# Patient Record
Sex: FEMALE | Race: WHITE
[De-identification: names, ages, dates, MRNs, and addresses within clinical notes are randomized per-mention and may not be internally consistent; named-entity substitution may affect disease eponyms.]

---

## 2019-06-26 ENCOUNTER — HOSPITAL ENCOUNTER (EMERGENCY)
Dept: HOSPITAL 97 - ER | Age: 34
Discharge: HOME | End: 2019-06-26
Payer: COMMERCIAL

## 2019-06-26 VITALS — SYSTOLIC BLOOD PRESSURE: 99 MMHG | DIASTOLIC BLOOD PRESSURE: 68 MMHG

## 2019-06-26 VITALS — OXYGEN SATURATION: 100 %

## 2019-06-26 VITALS — TEMPERATURE: 99.1 F

## 2019-06-26 DIAGNOSIS — K52.9: Primary | ICD-10-CM

## 2019-06-26 DIAGNOSIS — D72.829: ICD-10-CM

## 2019-06-26 DIAGNOSIS — K59.00: ICD-10-CM

## 2019-06-26 LAB
ALBUMIN SERPL BCP-MCNC: 3.9 G/DL (ref 3.4–5)
ALP SERPL-CCNC: 53 U/L (ref 45–117)
ALT SERPL W P-5'-P-CCNC: 23 U/L (ref 12–78)
AST SERPL W P-5'-P-CCNC: 25 U/L (ref 15–37)
BLD SMEAR INTERP: (no result)
BUN BLD-MCNC: 9 MG/DL (ref 7–18)
GLUCOSE SERPLBLD-MCNC: 105 MG/DL (ref 74–106)
HCT VFR BLD CALC: 40.3 % (ref 36–45)
LIPASE SERPL-CCNC: 91 U/L (ref 73–393)
LYMPHOCYTES # SPEC AUTO: 0.4 K/UL (ref 0.7–4.9)
MORPHOLOGY BLD-IMP: (no result)
PMV BLD: 8.1 FL (ref 7.6–11.3)
POTASSIUM SERPL-SCNC: 3.9 MMOL/L (ref 3.5–5.1)
RBC # BLD: 4.38 M/UL (ref 3.86–4.86)
UA COMPLETE W REFLEX CULTURE PNL UR: (no result)

## 2019-06-26 PROCEDURE — 81015 MICROSCOPIC EXAM OF URINE: CPT

## 2019-06-26 PROCEDURE — 81025 URINE PREGNANCY TEST: CPT

## 2019-06-26 PROCEDURE — 74177 CT ABD & PELVIS W/CONTRAST: CPT

## 2019-06-26 PROCEDURE — 80076 HEPATIC FUNCTION PANEL: CPT

## 2019-06-26 PROCEDURE — 81003 URINALYSIS AUTO W/O SCOPE: CPT

## 2019-06-26 PROCEDURE — 87088 URINE BACTERIA CULTURE: CPT

## 2019-06-26 PROCEDURE — 96361 HYDRATE IV INFUSION ADD-ON: CPT

## 2019-06-26 PROCEDURE — 96368 THER/DIAG CONCURRENT INF: CPT

## 2019-06-26 PROCEDURE — 80048 BASIC METABOLIC PNL TOTAL CA: CPT

## 2019-06-26 PROCEDURE — 96375 TX/PRO/DX INJ NEW DRUG ADDON: CPT

## 2019-06-26 PROCEDURE — 87086 URINE CULTURE/COLONY COUNT: CPT

## 2019-06-26 PROCEDURE — 83690 ASSAY OF LIPASE: CPT

## 2019-06-26 PROCEDURE — 87804 INFLUENZA ASSAY W/OPTIC: CPT

## 2019-06-26 PROCEDURE — 99284 EMERGENCY DEPT VISIT MOD MDM: CPT

## 2019-06-26 PROCEDURE — 85025 COMPLETE CBC W/AUTO DIFF WBC: CPT

## 2019-06-26 PROCEDURE — 96365 THER/PROPH/DIAG IV INF INIT: CPT

## 2019-06-26 PROCEDURE — 36415 COLL VENOUS BLD VENIPUNCTURE: CPT

## 2019-06-26 NOTE — EDPHYS
Physician Documentation                                                                           

 [facility]                                                                 

Name: Lety Kevin                                                                               

Age: 34 yrs                                                                                       

Sex: Female                                                                                       

: 1985                                                                                   

MRN: [id number]                                                                                   

Arrival Date: 2019                                                                          

Time: 02:43                                                                                       

Account#: [id number]                                                                            

Bed 15                                                                                            

Private MD:                                                                                       

[facility] Physician Tomás Tamez                                                                      

HPI:                                                                                              

                                                                                             

03:44 This 34 yrs old  Female presents to ER via Ambulatory with complaints of       brant 

      Nausea, Headache.                                                                           

03:44 The patient presents to the emergency department with nausea, vomiting. Onset: The      brant 

      symptoms/episode began/occurred just prior to arrival, this morning. Possible causes:       

      unknown. The symptoms are aggravated by movement. Associated signs and symptoms: The        

      patient has no apparent associated signs or symptoms. Severity of symptoms: At their        

      worst the symptoms were mild moderate in the emergency department the symptoms are          

      unchanged. The patient has not experienced similar symptoms in the past.                    

                                                                                                  

OB/GYN:                                                                                           

02:47 LMP 2019                                                                            ed1 

                                                                                                  

Historical:                                                                                       

- Allergies:                                                                                      

02:47 No Known Allergies;                                                                     ed1 

- Home Meds:                                                                                      

02:47 None [Active];                                                                          ed1 

- PMHx:                                                                                           

02:47 None;                                                                                   ed1 

- PSHx:                                                                                           

02:47 ;                                                                              ed1 

                                                                                                  

- Immunization history:: Adult Immunizations up to date.                                          

- Social history:: Smoking status: Patient/guardian denies using tobacco.                         

- Ebola Screening: : Patient negative for fever greater than or equal to 101.5 degrees            

  Fahrenheit, and additional compatible Ebola Virus Disease symptoms Patient denies               

  exposure to infectious person Patient denies travel to an Ebola-affected area in the            

  21 days before illness onset No symptoms or risks identified at this time.                      

                                                                                                  

                                                                                                  

ROS:                                                                                              

03:45 Constitutional: Negative for fever, chills, and weight loss, Eyes: Negative for injury, brant 

      pain, redness, and discharge, ENT: Negative for injury, pain, and discharge, Neck:          

      Negative for injury, pain, and swelling, Cardiovascular: Negative for chest pain,           

      palpitations, and edema, Respiratory: Negative for shortness of breath, cough,              

      wheezing, and pleuritic chest pain, Back: Negative for injury and pain, : Negative        

      for injury, bleeding, discharge, and swelling, MS/Extremity: Negative for injury and        

      deformity, Skin: Negative for injury, rash, and discoloration, Neuro: Negative for          

      headache, weakness, numbness, tingling, and seizure, Psych: Negative for depression,        

      anxiety, suicide ideation, homicidal ideation, and hallucinations, Allergy/Immunology:      

      Negative for hives, rash, and allergies, Endocrine: Negative for neck swelling,             

      polydipsia, polyuria, polyphagia, and marked weight changes, Hematologic/Lymphatic:         

      Negative for swollen nodes, abnormal bleeding, and unusual bruising.                        

03:45 Abdomen/GI: Positive for abdominal pain, of the right upper quadrant and right lower        

      quadrant.                                                                                   

                                                                                                  

Exam:                                                                                             

03:45 Constitutional:  This is a well developed, well nourished patient who is awake, alert,  brant 

      and in no acute distress. Head/Face:  Normocephalic, atraumatic. Eyes:  Pupils equal        

      round and reactive to light, extra-ocular motions intact.  Lids and lashes normal.          

      Conjunctiva and sclera are non-icteric and not injected.  Cornea within normal limits.      

      Periorbital areas with no swelling, redness, or edema. ENT:  Nares patent. No nasal         

      discharge, no septal abnormalities noted.  Tympanic membranes are normal and external       

      auditory canals are clear.  Oropharynx with no redness, swelling, or masses, exudates,      

      or evidence of obstruction, uvula midline.  Mucous membranes moist. Neck:  Trachea          

      midline, no thyromegaly or masses palpated, and no cervical lymphadenopathy.  Supple,       

      full range of motion without nuchal rigidity, or vertebral point tenderness.  No            

      Meningismus. Chest/axilla:  Normal chest wall appearance and motion.  Nontender with no     

      deformity.  No lesions are appreciated. Cardiovascular:  Regular rate and rhythm with a     

      normal S1 and S2.  No gallops, murmurs, or rubs.  Normal PMI, no JVD.  No pulse             

      deficits. Respiratory:  Lungs have equal breath sounds bilaterally, clear to                

      auscultation and percussion.  No rales, rhonchi or wheezes noted.  No increased work of     

      breathing, no retractions or nasal flaring. Back:  No spinal tenderness.  No                

      costovertebral tenderness.  Full range of motion. Female :  Normal external               

      genitalia. Skin:  Warm, dry with normal turgor.  Normal color with no rashes, no            

      lesions, and no evidence of cellulitis. MS/ Extremity:  Pulses equal, no cyanosis.          

      Neurovascular intact.  Full, normal range of motion. Neuro:  Awake and alert, GCS 15,       

      oriented to person, place, time, and situation.  Cranial nerves II-XII grossly intact.      

      Motor strength 5/5 in all extremities.  Sensory grossly intact.  Cerebellar exam            

      normal.  Normal gait. Psych:  Awake, alert, with orientation to person, place and time.     

       Behavior, mood, and affect are within normal limits.                                       

03:45 Abdomen/GI: Inspection: abdomen appears normal, Bowel sounds: normal, Palpation:            

      moderate abdominal tenderness, in the right upper quadrant and right lower quadrant,        

      Liver: no appreciated palpable abnormalities, Hernia: not appreciated.                      

                                                                                                  

Vital Signs:                                                                                      

02:47  / 76; Pulse 114; Resp 18; Temp 99.1(TE); Pulse Ox 100% on R/A; Weight 56.7 kg;   ed1 

      Height 5 ft. 6 in. (167.64 cm); Pain 9/10;                                                  

04:30  / 69; Pulse 97; Resp 16; Pulse Ox 97% on R/A;                                    jb4 

05:30  / 65; Pulse 107; Resp 18; Pulse Ox 100% on R/A;                                  jb4 

07:00 BP 99 / 68; Pulse 98; Resp 16; Pulse Ox 100% on R/A;                                    jb4 

02:47 Body Mass Index 20.18 (56.70 kg, 167.64 cm)                                             ed1 

                                                                                                  

MDM:                                                                                              

03:20 Patient medically screened.                                                             [facility] 

03:46 Data reviewed: vital signs, nurses notes, lab test result(s), EKG, radiologic studies.  [facility] 

                                                                                                  

                                                                                             

02:47 Order name: Urine Culture                                                               [facility] 

                                                                                             

02:47 Order name: Urine Microscopic Only; Complete Time: 04:50                                [facility] 

                                                                                             

03:10 Order name: Flu; Complete Time: 05:36                                                   [facility] 

                                                                                             

03:21 Order name: Urine Dipstick--Ancillary (enter results); Complete Time: 03:42             [facility] 

                                                                                             

03:21 Order name: Urine Pregnancy--Ancillary (enter results); Complete Time: 03:42            [facility] 

                                                                                             

03:44 Order name: Basic Metabolic Panel                                                       [facility] 

                                                                                             

03:44 Order name: CBC with Diff                                                               [facility] 

                                                                                             

03:44 Order name: Hepatic Function; Complete Time: 04:50                                      [facility] 

                                                                                             

03:44 Order name: Lipase; Complete Time: 04:50                                                [facility] 

                                                                                             

03:44 Order name: CT Abd/Pelvis - IV Contrast Only                                            [facility] 

                                                                                             

03:44 Order name: Basic Metabolic Panel; Complete Time: 04:50                                 [facility]

                                                                                             

03:45 Order name: CBC with Automated Diff; Complete Time: 05:36                               [facility]

                                                                                             

05:11 Order name: CBC Smear Scan; Complete Time: 05:36                                        [facility]

                                                                                             

02:47 Order name: Urine Pregnancy Test (obtain specimen); Complete Time: 03:19                [facility] 

                                                                                             

02:47 Order name: Urine Dipstick-Ancillary (obtain specimen); Complete Time: 03:20            [facility] 

                                                                                             

03:44 Order name: IV Saline Lock; Complete Time: 04:30                                        brant 

                                                                                             

03:44 Order name: Labs collected and sent; Complete Time: 04:30                               brant 

                                                                                                  

Administered Medications:                                                                         

03:55 Drug: Zofran 4 mg Route: IVP; Site: right antecubital;                                  jb4 

04:25 Follow up: Response: No adverse reaction; Nausea is decreased                           jb4 

03:57 Drug: NS 0.9% 1000 ml Route: IV; Rate: 1 bolus; Site: right antecubital;                4 

05:00 Follow up: Response: No adverse reaction; IV Status: Completed infusion; IV Intake:     jb4 

      1000ml                                                                                      

03:58 Drug: morphine 2 mg Route: IVP; Site: right antecubital;                                jb4 

04:10 Follow up: Response: No adverse reaction; Pain is unchanged, physician notified         jb4 

04:43 Drug: morphine 2 mg Route: IVP; Site: right antecubital;                                jb4 

05:10 Follow up: Response: No adverse reaction; Pain is unchanged, physician notified         jb4 

05:25 Drug: morphine 4 mg Route: IVP; Site: right antecubital;                                jb4 

05:55 Follow up: Response: No adverse reaction; Pain is decreased                             jb4 

06:30 Drug: Cipro 400 mg Volume: 200 ml; Route: IVPB; Infused Over: 60 mins; Site: right      jb4 

      antecubital;                                                                                

07:45 Follow up: Response: No adverse reaction; IV Status: Completed infusion                   

06:30 Drug: Flagyl 500 mg Volume: 100 ml; Route: IVPB; Rate: 200 ml/hr; Infused Over: 30      jb4 

      mins; Site: right antecubital;                                                              

07:00 Follow up: Response: No adverse reaction; IV Status: Completed infusion; IV Intake:     jb4 

      100ml                                                                                       

                                                                                                  

                                                                                                  

Disposition:                                                                                      

19 06:31 Discharged to Home. Impression: Abdominal tenderness - enteritis, Elevated         

  white blood cell count, Constipation.                                                           

- Condition is Stable.                                                                            

- Discharge Instructions: Abdominal Pain, Adult, Constipation, Adult, Nausea and                  

  Vomiting, Adult, Abdominal Pain, Adult, Easy-to-Read.                                           

- Prescriptions for Bentyl 20 mg Oral Tablet - take 1 tablet by ORAL route every 6                

  hours As needed; 20 tablet. Flagyl 500 mg Oral Tablet - take 1 tablet by ORAL route             

  every 8 hours for 7 days; 21 tablet. Pepcid 20 mg Oral Tablet - take 1 tablet by ORAL           

  route every 12 hours for 10 days; 20 tablet. Zofran 4 mg Oral Tablet - take 1 tablet            

  by ORAL route every 12 hours As needed; 20 tablet. Cipro 500 mg Oral Tablet - take 1            

  tablet by ORAL route every 12 hours for 7 days; 14 tablet.                                      

- Medication Reconciliation Form, Thank You Letter, Antibiotic Education, Prescription            

  Opioid Use, Work release form form.                                                             

- Follow up: Private Physician; When: 2 - 3 days; Reason: Recheck today's complaints,             

  Continuance of care, Re-evaluation by your physician. Follow up: Jasper Allen MD;           

  When: 2 - 3 days; Reason: Recheck today's complaints, Re-evaluation by your physician.          

- Problem is new.                                                                                 

- Symptoms have improved.                                                                         

                                                                                                  

                                                                                                  

                                                                                                  

Signatures:                                                                                       

Dispatcher [employer]                           [facility]                                                 

Tomás Tamez MD MD cha Therrien, Shelly, FNP-C                 FNP-Csnw                                                  

Bianca Carson, RN                        RN   ed1                                                  

Kathy Manriquez, RN                      RN   Davis Gill, RN                       RN   jb4                                                  

                                                                                                  

Corrections: (The following items were deleted from the chart)                                    

05:02 03:45 Creatinine for Radiology+C.LAB.BRZ ordered. [facility]

06:32 06:31 2019 06:31 Discharged to Home. Impression: Abdominal tenderness -           brant 

      enteritis; Elevated white blood cell count; Constipation. Condition is Stable. Forms        

      are Medication Reconciliation Form, Thank You Letter, Antibiotic Education,                 

      Prescription Opioid Use. Follow up: Private Physician; When: 2 - 3 days; Reason:            

      Recheck today's complaints, Continuance of care, Re-evaluation by your physician.           

      Problem is new. Symptoms have improved. brant                                                 

08:50 06:32 2019 06:31 Discharged to Home. Impression: Abdominal tenderness -           ph  

      enteritis; Elevated white blood cell count; Constipation. Condition is Stable. Forms        

      are Medication Reconciliation Form, Thank You Letter, Antibiotic Education,                 

      Prescription Opioid Use. Follow up: Private Physician; When: 2 - 3 days; Reason:            

      Recheck today's complaints, Continuance of care, Re-evaluation by your physician.           

      Follow up: Jasper Allen; When: 2 - 3 days; Reason: Recheck today's complaints,            

      Re-evaluation by your physician. Problem is new. Symptoms have improved. brant                

                                                                                                  

**************************************************************************************************

## 2019-06-26 NOTE — ER
Nurse's Notes                                                                                     

 [facility]                                                                 

Name: Lety Kevin                                                                               

Age: 34 yrs                                                                                       

Sex: Female                                                                                       

: 1985                                                                                   

MRN: [id number]                                                                                   

Arrival Date: 2019                                                                          

Time: 02:43                                                                                       

Account#: [id number]                                                                            

Bed 15                                                                                            

Private MD:                                                                                       

Diagnosis: Abdominal tenderness-enteritis;Elevated white blood cell count;Constipation            

                                                                                                  

Presentation:                                                                                     

                                                                                             

02:45 Presenting complaint: Patient states: On Monday I was nauseated and I threw up.         ed1 

      Yesterday when I woke up I began having abdominal pain and a bad headache. Transition       

      of care: patient was not received from another setting of care. Onset of symptoms was       

      2019. Risk Assessment: Do you want to hurt yourself or someone else? Patient       

      reports no desire to harm self or others. Initial Sepsis Screen: Does the patient meet      

      any 2 criteria? No. Patient's initial sepsis screen is negative. Does the patient have      

      a suspected source of infection? No. Patient's initial sepsis screen is negative. Care      

      prior to arrival: None.                                                                     

02:45 Method Of Arrival: Ambulatory                                                           ed1 

02:45 Acuity: SERGIO 3                                                                           ed1 

                                                                                                  

Triage Assessment:                                                                                

02:47 General: Appears uncomfortable, Behavior is calm, cooperative. Pain: Complains of pain  ed1 

      in head and abdomen Pain currently is 9 out of 10 on a pain scale. GI: Reports nausea,      

      vomiting.                                                                                   

                                                                                                  

OB/GYN:                                                                                           

02:47 LMP 2019                                                                            ed1 

                                                                                                  

Historical:                                                                                       

- Allergies:                                                                                      

02:47 No Known Allergies;                                                                     ed1 

- Home Meds:                                                                                      

02:47 None [Active];                                                                          ed1 

- PMHx:                                                                                           

02:47 None;                                                                                   ed1 

- PSHx:                                                                                           

02:47 ;                                                                              ed1 

                                                                                                  

- Immunization history:: Adult Immunizations up to date.                                          

- Social history:: Smoking status: Patient/guardian denies using tobacco.                         

- Ebola Screening: : Patient negative for fever greater than or equal to 101.5 degrees            

  Fahrenheit, and additional compatible Ebola Virus Disease symptoms Patient denies               

  exposure to infectious person Patient denies travel to an Ebola-affected area in the            

  21 days before illness onset No symptoms or risks identified at this time.                      

                                                                                                  

                                                                                                  

Screenin:15 Abuse screen: Denies threats or abuse. Nutritional screening: No deficits noted.        jb4 

      Tuberculosis screening: No symptoms or risk factors identified. Fall Risk None              

      identified.                                                                                 

                                                                                                  

Assessment:                                                                                       

03:15 General: Appears in no apparent distress. uncomfortable, Behavior is calm, cooperative, jb4 

      appropriate for age. Pain: Complains of pain in abdomen, headache. Pain does not            

      radiate. Pain currently is 10 out of 10 on a pain scale. Quality of pain is described       

      as crampy, stabbing. Neuro: Level of Consciousness is awake, alert, obeys commands,         

      Oriented to person, place, time, situation. Cardiovascular: Heart tones S1 S2 present       

      Patient's skin is warm and dry. Respiratory: Airway is patent Respiratory effort is         

      even, unlabored, Respiratory pattern is regular, symmetrical, Breath sounds are clear       

      bilaterally. GI: Abdomen is flat, non-distended, Bowel sounds present X 4 quads.            

      Reports nausea. : No signs and/or symptoms were reported regarding the genitourinary      

      system. EENT: No signs and/or symptoms were reported regarding the EENT system. Derm:       

      Skin is intact, Skin is pink, warm \T\ dry. Musculoskeletal: Circulation, motion, and       

      sensation intact.                                                                           

04:35 Reassessment: Patient appears in no apparent distress at this time. No changes from     jb4 

      previously documented assessment. Patient and/or family updated on plan of care and         

      expected duration. Pain level reassessed. Patient is alert, oriented x 3, equal             

      unlabored respirations, skin warm/dry/pink.                                                 

04:43 Reassessment: Pt reports that first dose of Morphine did not help, second dose          jb4 

      administered per MAR.                                                                       

05:10 Reassessment: Patient appears in no apparent distress at this time. Patient and/or      jb4 

      family updated on plan of care and expected duration. Pain level reassessed. Patient is     

      alert, oriented x 3, equal unlabored respirations, skin warm/dry/pink. PT back from CT,     

      reports second dose of pain medication did not work, provider notified, see MAR for         

      orders.                                                                                     

06:00 Reassessment: Patient appears in no apparent distress at this time. Patient and/or      jb4 

      family updated on plan of care and expected duration. Pain level reassessed. Patient is     

      alert, oriented x 3, equal unlabored respirations, skin warm/dry/pink. resting in bed       

      with significant other. Patient states feeling better.                                      

06:55 Reassessment: Patient appears in no apparent distress at this time. Patient and/or      jb4 

      family updated on plan of care and expected duration. Pain level reassessed. Patient is     

      alert, oriented x 3, equal unlabored respirations, skin warm/dry/pink. Pt verbalized        

      understanding of d/c and follow up instructions. Waiting for IV fluids to finish prior      

      to d/c.                                                                                     

07:45 Reassessment: Patient appears in no apparent distress at this time. Patient and/or      ph  

      family updated on plan of care and expected duration. Pain level reassessed. Patient is     

      alert, oriented x 3, equal unlabored respirations, skin warm/dry/pink. ERP at bedside       

      to speak w/ pt, requesting that d/c be delayed to allow our radiologist to read CT to       

      verify results, pt resting quietly at this time.                                            

                                                                                                  

Vital Signs:                                                                                      

02:47  / 76; Pulse 114; Resp 18; Temp 99.1(TE); Pulse Ox 100% on R/A; Weight 56.7 kg;   ed1 

      Height 5 ft. 6 in. (167.64 cm); Pain 9/10;                                                  

04:30  / 69; Pulse 97; Resp 16; Pulse Ox 97% on R/A;                                    jb4 

05:30  / 65; Pulse 107; Resp 18; Pulse Ox 100% on R/A;                                  jb4 

07:00 BP 99 / 68; Pulse 98; Resp 16; Pulse Ox 100% on R/A;                                    jb4 

02:47 Body Mass Index 20.18 (56.70 kg, 167.64 cm)                                             ed1 

                                                                                                  

ED Course:                                                                                        

02:43 Patient arrived in ED.                                                                  ag3 

02:46 Triage completed.                                                                       ed1 

02:47 Arm band placed on left wrist.                                                          ed1 

03:08 Davis Hirsch, RN is Primary Nurse.                                                     jb4 

03:15 Patient has correct armband on for positive identification. Bed in low position. Call   jb4 

      light in reach. Side rails up X 1. Pulse ox on. NIBP on.                                    

03:19 Tomás Tamez MD is Attending Physician.                                             brant 

03:45 Initial lab(s) drawn, by me, sent to lab. Inserted saline lock: 22 gauge in right       jb4 

      antecubital area, using aseptic technique. Blood collected.                                 

05:40 CT Abd/Pelvis - IV Contrast Only In Process Unspecified.                                EDMS

06:32 Jasper Allen MD is Referral Physician.                                              brant 

07:09 No provider procedures requiring assistance completed.                                  jb4 

                                                                                                  

Administered Medications:                                                                         

03:55 Drug: Zofran 4 mg Route: IVP; Site: right antecubital;                                  jb4 

04:25 Follow up: Response: No adverse reaction; Nausea is decreased                           jb4 

03:57 Drug: NS 0.9% 1000 ml Route: IV; Rate: 1 bolus; Site: right antecubital;                jb4 

05:00 Follow up: Response: No adverse reaction; IV Status: Completed infusion; IV Intake:     jb4 

      1000ml                                                                                      

03:58 Drug: morphine 2 mg Route: IVP; Site: right antecubital;                                jb4 

04:10 Follow up: Response: No adverse reaction; Pain is unchanged, physician notified         jb4 

04:43 Drug: morphine 2 mg Route: IVP; Site: right antecubital;                                jb4 

05:10 Follow up: Response: No adverse reaction; Pain is unchanged, physician notified         jb4 

05:25 Drug: morphine 4 mg Route: IVP; Site: right antecubital;                                jb4 

05:55 Follow up: Response: No adverse reaction; Pain is decreased                             jb4 

06:30 Drug: Cipro 400 mg Volume: 200 ml; Route: IVPB; Infused Over: 60 mins; Site: right      jb4 

      antecubital;                                                                                

07:45 Follow up: Response: No adverse reaction; IV Status: Completed infusion                 ph  

06:30 Drug: Flagyl 500 mg Volume: 100 ml; Route: IVPB; Rate: 200 ml/hr; Infused Over: 30      jb4 

      mins; Site: right antecubital;                                                              

07:00 Follow up: Response: No adverse reaction; IV Status: Completed infusion; IV Intake:     jb4 

      100ml                                                                                       

                                                                                                  

                                                                                                  

Intake:                                                                                           

05:00 IV: 1000ml; Total: 1000ml.                                                              jb4 

07:00 IV: 100ml; Total: 1100ml.                                                               jb4 

                                                                                                  

Outcome:                                                                                          

06:31 Discharge ordered by MD. guo 

07:09 Discharge instructions given to patient, significant other, Instructed on discharge     jb4 

      instructions, follow up and referral plans. medication usage, Demonstrated                  

      understanding of instructions, follow-up care, medications, Prescriptions given X 5         

08:50 Patient left the ED.                                                                    ph  

                                                                                                  

Signatures:                                                                                       

Dispatcher [employer]                           EDTomás Perkins MD MD cha Riggs, Erika RN                        RN   ed1                                                  

Kathy Manriquez RN                      RN   ph                                                   

Davis Hirsch RN                       RN   jb4                                                  

Kassandra Hubbard                                 ag3                                                  

                                                                                                  

Corrections: (The following items were deleted from the chart)                                    

07:11 06:55 Reassessment: Patient appears in no apparent distress at this time. Patient       jb4 

      and/or family updated on plan of care and expected duration. Pain level reassessed.         

      Patient is alert, oriented x 3, equal unlabored respirations, skin warm/dry/pink. Pt        

      verbalized understanding of d/c and follow up instructions. jb4                             

                                                                                                  

**************************************************************************************************

## 2019-06-26 NOTE — RAD REPORT
EXAM DESCRIPTION:  CT ABDOMEN AND PELVIS WITH CONTRAST. 6/26/2019

 

CLINICAL HISTORY:  Nausea and vomiting with abdominal pain. Headache.

 

COMPARISON:  None.

 

TECHNIQUE:  Axial 5 mm CT imaging of the abdomen and pelvis performed utilizing intravenous contrast.
 Reformatted coronal and sagittal images reviewed.

A dose reduction technique was utilized with automated exposure control according to patient size.

 

FINDINGS:  LOWER THORAX:  Clear lung bases. Heart is normal in size. No pericardial fluid.

ABDOMEN:

LIVER/GALLBLADDER:  Normal liver size and contour, and attenuation. Normal gallbladder.

SPLEEN/PANCREAS:  Normal spleen and pancreas.

KIDNEYS/ADRENAL GLANDS:  Normal adrenal glands. Mild dilatation of the right and left renal pelvis. N
o collecting system stone. No renal mass. No perinephric edema.

RETROPERITONEAL VESSELS/NODES:  Normal aorta and inferior vena cava caliber. No adenopathy. Mesenteri
c vessels appear normal.

BOWEL:  Normal stomach. Small bowel loops are normal in caliber. Normal appendix in the right lower q
uadrant retrocecal position. Scattered atelectasis within the ascending and transverse colon. Moderat
e stool within the descending colon.

MESENTERY/PERITONEUM:  No adenopathy. No ascites. No free air.

PELVIS:

BLADDER:  Unremarkable bladder.

GENITAL ORGANS:  Mild endometrial fluid. Uterus otherwise unremarkable. Follicular changes are seen w
ithin the ovaries.

PERITONEUM:  No pelvic free fluid or adenopathy.

BONES AND SOFT TISSUES:  Normal lumbosacral alignment. No subluxation. Intact bony pelvis. Normal hip
s.

 

IMPRESSION:  1. Enteritis.

2. Mild constipation.

3. Mild fullness of the right and left renal pelvis with no obstructing etiology.

 

Electronically signed by:   Cherelle Eason DO   6/26/2019 5:56 AM CDT Workstation: [id number]

 

 

Due to temporary technical issues with the PACS/Fluency reporting system, reports are being signed by
 the in house radiologist as a courtesy to ensure prompt reporting. The interpreting radiologist is f
ully responsible for the content of the report.